# Patient Record
Sex: FEMALE | Race: ASIAN | NOT HISPANIC OR LATINO | URBAN - METROPOLITAN AREA
[De-identification: names, ages, dates, MRNs, and addresses within clinical notes are randomized per-mention and may not be internally consistent; named-entity substitution may affect disease eponyms.]

---

## 2019-07-20 ENCOUNTER — EMERGENCY (EMERGENCY)
Facility: HOSPITAL | Age: 34
LOS: 1 days | Discharge: HOME | End: 2019-07-20
Attending: EMERGENCY MEDICINE
Payer: COMMERCIAL

## 2019-07-20 VITALS
RESPIRATION RATE: 16 BRPM | HEART RATE: 66 BPM | HEIGHT: 66 IN | SYSTOLIC BLOOD PRESSURE: 121 MMHG | DIASTOLIC BLOOD PRESSURE: 85 MMHG | WEIGHT: 139.99 LBS | OXYGEN SATURATION: 100 % | TEMPERATURE: 98 F

## 2019-07-20 PROCEDURE — 99284 EMERGENCY DEPT VISIT MOD MDM: CPT | Mod: 25

## 2019-07-20 PROCEDURE — 93010 ELECTROCARDIOGRAM REPORT: CPT | Mod: NC

## 2019-07-20 RX ORDER — SODIUM CHLORIDE 9 MG/ML
1000 INJECTION, SOLUTION INTRAVENOUS ONCE
Refills: 0 | Status: COMPLETED | OUTPATIENT
Start: 2019-07-20 | End: 2019-07-20

## 2019-07-20 NOTE — ED PROVIDER NOTE - CARE PLAN
Principal Discharge DX:	Syncope and collapse  Secondary Diagnosis:	Lip laceration, initial encounter  Secondary Diagnosis:	Dehydration, mild Principal Discharge DX:	Syncope and collapse  Secondary Diagnosis:	Lip laceration, initial encounter  Secondary Diagnosis:	Dehydration, mild  Secondary Diagnosis:	Dental contusion, initial encounter

## 2019-07-20 NOTE — ED PROVIDER NOTE - OBJECTIVE STATEMENT
32yo female pt, no PMHx, ambulatory with steady gait, 12weeks preg (GP-1-0, LMP-4/19/19) c/o syncopal episode and right lower lip laceration this evening. Pt state she felt hot and passed out/ fell. She noticed right lower lip laceration after the fall and felt right sided neck pain. Denies seizure like motions. Denies headache, dizziness or visual changes. Denies N/V. Denies radiating pain, sensory changes or weakness to extremities. Denies CP/SOB/ABD pain or back pain. Denies urinary problems. Denies vaginal bleeding or discharge. Denies fever, chills or recent sickness. TD- UTDed. 34yo female pt, no PMHx, ambulatory with steady gait, 12weeks preg (GP-1-0, LMP-4/19/19) c/o syncopal episode and right lower lip laceration this evening. Pt state she felt hot and passed out/ fell. She noticed right lower lip laceration, possible bite her self, after the fall and felt right sided neck pain. Denies seizure like motions. Denies dental pain. Denies headache, dizziness or visual changes. Denies N/V. Denies radiating pain, sensory changes or weakness to extremities. Denies CP/SOB/ABD pain or back pain. Denies urinary problems. Denies vaginal bleeding or discharge. Denies fever, chills or recent sickness. TD- UTDed.

## 2019-07-20 NOTE — ED PROVIDER NOTE - CARE PROVIDER_API CALL
Jennifre Mckinney)  Plastic Surgery; Surgery of the Hand  [address] [place] [address]  Phone: [phone]  Fax: [phone]  Follow Up Time:

## 2019-07-20 NOTE — ED PROVIDER NOTE - NSFOLLOWUPINSTRUCTIONS_ED_ALL_ED_FT
See your OBGYN tomorrow as scheduled.    Stay well hydrated, eat regular healthy meals.    Augmentin as directed -- see medication warnings.    See SYNCOPE and FACIAL LACERATION information and return instructions.    Seek immediate medical care for new/worsening symptoms/concerns. See your OBGYN tomorrow as scheduled and Dr Mckinney as directed.    Stay well hydrated, eat regular healthy meals (soft mechanical diet for several days).    Augmentin as directed -- see medication warnings.    See SYNCOPE and FACIAL LACERATION information and return instructions.    Seek immediate medical care for new/worsening symptoms/concerns.

## 2019-07-20 NOTE — ED PROVIDER NOTE - PHYSICAL EXAMINATION
NAD, VSS, Afebrile, + PERRL with full EOMs. + two lacerations on lower lip (1cm on right corner of lip and 3cm deep laceration) with vermilion border involvement. + through and through 1cm laceration on chin. No facial . Small avulsion dental fx on right upper molar without tenderness. No spinal midline tender. + right sided cervical trapezium tender without swelling or lesions. Lungs clear. ABD soft, non tender. No pelvic or hip tender. No focal neuro deficit.

## 2019-07-20 NOTE — ED PROVIDER NOTE - ATTENDING CONTRIBUTION TO CARE
------------ATTENDING NOTE------------   healthy vaccinated pt w/  c/o passing out tonight, describes feeling lightheaded while standing, feeling hot, tunnel vision, sudden LOC, witnessed by  (a medicine physician), hit face on ground and laceration to lip, no abnormal movements, back to baseline in seconds, no chest pain/discomfort or SOB/dyspnea, no palpitations, normal neuro exam (AOX3, nml speech, CN grossly intact, VF/VA wnl, nml strength/sensation, nml gait, (-)ataxia/Romberg), CTL spine clinically cleared, benign stable chest/abdomen, is 12 wk EGA () w/o pelvic pain or abdominal pain or LOF, FHR wnl, no abdominal trauma (will see OB tomorrow, spoke with on phone after fall), nml VS at dc, c/w vasovagal syncope, in depth dw all about ddx, tx, garcia, continued close outpt fu.  - Reinaldo Pedro MD   --------------------------------------------- ------------ATTENDING NOTE------------   healthy vaccinated pt w/  c/o passing out tonight, describes feeling lightheaded while standing, feeling hot, tunnel vision, sudden LOC, witnessed by  (a medicine physician), hit face on ground and laceration to lip, no abnormal movements, back to baseline in seconds, no chest pain/discomfort or SOB/dyspnea, no palpitations, normal neuro exam (AOX3, nml speech, CN grossly intact, VF/VA wnl, nml strength/sensation, nml gait, (-)ataxia/Romberg), otherwise normal HEENT w/ mild tenderness to teeth 23/24, no dental malocclusion or trismus, CTL spine clinically cleared, benign stable chest/abdomen, is 12 wk EGA () w/o pelvic pain or abdominal pain or LOF, FHR wnl, no abdominal trauma (will see OB tomorrow, spoke with on phone after fall), nml VS at dc, c/w vasovagal syncope, in depth dw all about ddx, tx, garcia, continued close outpt fu.  - Reinaldo Pedro MD   ---------------------------------------------

## 2019-07-21 VITALS
RESPIRATION RATE: 16 BRPM | HEART RATE: 70 BPM | SYSTOLIC BLOOD PRESSURE: 115 MMHG | OXYGEN SATURATION: 100 % | DIASTOLIC BLOOD PRESSURE: 76 MMHG

## 2019-07-21 LAB
BLD GP AB SCN SERPL QL: NEGATIVE — SIGNIFICANT CHANGE UP
RH IG SCN BLD-IMP: POSITIVE — SIGNIFICANT CHANGE UP

## 2019-07-21 PROCEDURE — 86901 BLOOD TYPING SEROLOGIC RH(D): CPT

## 2019-07-21 PROCEDURE — 86850 RBC ANTIBODY SCREEN: CPT

## 2019-07-21 PROCEDURE — 86900 BLOOD TYPING SEROLOGIC ABO: CPT

## 2019-07-21 PROCEDURE — 99285 EMERGENCY DEPT VISIT HI MDM: CPT | Mod: 25

## 2019-07-21 PROCEDURE — 40654 RPR LIP FTH>1HALF VER HT/CPX: CPT

## 2019-07-21 PROCEDURE — 93005 ELECTROCARDIOGRAM TRACING: CPT | Mod: XU

## 2019-07-21 PROCEDURE — 13132 CMPLX RPR F/C/C/M/N/AX/G/H/F: CPT | Mod: XU

## 2019-07-21 RX ADMIN — SODIUM CHLORIDE 2000 MILLILITER(S): 9 INJECTION, SOLUTION INTRAVENOUS at 00:46

## 2019-07-21 RX ADMIN — Medication 1 TABLET(S): at 01:04

## 2019-07-21 NOTE — ED ADULT NURSE NOTE - NSIMPLEMENTINTERV_GEN_ALL_ED
Implemented All Universal Safety Interventions:  [place] to call system. Call bell, personal items and telephone within reach. Instruct patient to call for assistance. Room bathroom lighting operational. Non-slip footwear when patient is off stretcher. Physically safe environment: no spills, clutter or unnecessary equipment. Stretcher in lowest position, wheels locked, appropriate side rails in place.

## 2019-07-21 NOTE — ED ADULT NURSE NOTE - OBJECTIVE STATEMENT
32y/o female hx presents to the ED ambulating from home c/o syncope episode  . Pt states. Denies fever, chills, n/v, weakness, abd pain, diarrhea/constipation, numbness/tingling, urinary s/s. Pt A&Ox3, in no respiratory distress, no CP, PT safety maintained with family at bedside, call bell within reach and bed in the lowest position. 34y/o female hx presents to the ED ambulating from home c/o syncope episode, Pt states. They were waiting in the restaurant and she felt hot and pass out, hit her mouth with the stool bar, Pt had Right lower lip laceration, no head injury.  Denies fever, chills, n/v, weakness, abd pain, diarrhea/constipation, numbness/tingling, urinary problems, vaginal bleeding or discharge. Denies Sz. . Pt A&Ox3, in no respiratory distress, no CP, PT safety maintained with family at bedside, call bell within reach and bed in the lowest position.